# Patient Record
Sex: MALE | NOT HISPANIC OR LATINO | URBAN - METROPOLITAN AREA
[De-identification: names, ages, dates, MRNs, and addresses within clinical notes are randomized per-mention and may not be internally consistent; named-entity substitution may affect disease eponyms.]

---

## 2018-01-22 ENCOUNTER — IMPORTED ENCOUNTER (OUTPATIENT)
Dept: URBAN - METROPOLITAN AREA CLINIC 1 | Facility: CLINIC | Age: 28
End: 2018-01-22

## 2018-01-22 PROBLEM — H50.10: Noted: 2018-01-22

## 2018-01-22 PROCEDURE — 92004 COMPRE OPH EXAM NEW PT 1/>: CPT

## 2018-01-22 NOTE — PATIENT DISCUSSION
1.  Exotropia OU: (H/o CVA since birth)2. Return for an appointment for 1yr/30 with Dr. Carolann Berg.

## 2019-03-15 ENCOUNTER — IMPORTED ENCOUNTER (OUTPATIENT)
Dept: URBAN - METROPOLITAN AREA CLINIC 1 | Facility: CLINIC | Age: 29
End: 2019-03-15

## 2019-03-15 PROBLEM — H50.10: Noted: 2019-03-15

## 2019-03-15 PROCEDURE — 92014 COMPRE OPH EXAM EST PT 1/>: CPT

## 2019-05-13 NOTE — PROCEDURE NOTE: CLINICAL
PROCEDURE NOTE: Laser for Retinal Tear OD. Diagnosis: Horseshoe Tear of Retina Without Detachment. Prior to laser, risks/benefits/alternatives to laser discussed including loss of vision, decreased peripheral and night vision, need for more laser and/or surgery and patient wished to proceed. An informed consent was obtained and no assurances or guarantees were given. Spot size: 50 um. Pulse power: 300 mW. Pulse duration: 150 ms. Number of pulses: 97. Procedure Time: 4:38. Patient tolerated procedure well. There were no complications. Post-op instructions given. Patient given office phone number/answering service number and advised to call immediately should there be loss of vision or pain, or should they have any other questions or concerns. Ash Casiano

## 2019-05-21 NOTE — PATIENT DISCUSSION
? COMPONENT OF ALCOHOL TOBACCO SYNDROME - LABS 5 16 19 POSITIVE LEAD 6 (ML <5), LOW WBC 3.4;  NEGATIVE B12, FOLATE - RECOM EVAL WITH INTERNIST FOR LEAD.

## 2019-05-21 NOTE — PATIENT DISCUSSION
Recommended laser AROUND HOLE GIVEN NEED FOR CATARACT SURGERY SHORTLY.  Discussed alternatives/benefits/risks to include development of new tears, tears along the edge of treated area, and retinal detachment.

## 2019-05-21 NOTE — PATIENT DISCUSSION
RECOM WAITING 4 WKS FOR CATARACT SURGERY TO ALLOW LASER TO HEAL OD OTHERWISE NO CONTRAINDICATION TO CAT SURGERY.

## 2019-05-21 NOTE — PATIENT DISCUSSION
Cataract APPEARS VISUALLY SIGNIFICANT and patient advised to SEE  [facility] and treatment.

## 2020-07-09 ENCOUNTER — IMPORTED ENCOUNTER (OUTPATIENT)
Dept: URBAN - METROPOLITAN AREA CLINIC 1 | Facility: CLINIC | Age: 30
End: 2020-07-09

## 2020-07-09 PROBLEM — H50.10: Noted: 2020-07-09

## 2020-07-09 PROCEDURE — 92014 COMPRE OPH EXAM EST PT 1/>: CPT

## 2020-07-09 PROCEDURE — 92015 DETERMINE REFRACTIVE STATE: CPT

## 2020-07-09 NOTE — PATIENT DISCUSSION
1.  Exotropia OU: (H/o CVA since birth)2. MRX for glasses given. Return for an appointment in 1 year 27 with Dr. Grant Browne.

## 2021-12-08 ENCOUNTER — IMPORTED ENCOUNTER (OUTPATIENT)
Dept: URBAN - METROPOLITAN AREA CLINIC 1 | Facility: CLINIC | Age: 31
End: 2021-12-08

## 2021-12-08 PROBLEM — H52.12: Noted: 2021-12-08

## 2021-12-08 PROBLEM — H50.10: Noted: 2021-12-08

## 2021-12-08 PROBLEM — H52.31: Noted: 2021-12-08

## 2021-12-08 PROCEDURE — 92015 DETERMINE REFRACTIVE STATE: CPT

## 2021-12-08 PROCEDURE — 92014 COMPRE OPH EXAM EST PT 1/>: CPT

## 2021-12-08 NOTE — PATIENT DISCUSSION
1.  Exotropia OU2. Anisometropia OS3. Myopia: Rx was given for correction if indicated and requested. 4. Return for an appointment in 1 year for 30 with Dr. Shaheen Fountain.

## 2022-04-02 ASSESSMENT — VISUAL ACUITY
OS_CC: J1
OD_SC: 20/20
OD_CC: J1
OS_SC: 20/20
OS_SC: 20/20
OD_SC: 20/20
OD_SC: 20/20
OS_CC: J1
OD_CC: J1
OS_SC: 20/20
OD_SC: 20/20
OS_SC: 20/20

## 2022-04-02 ASSESSMENT — TONOMETRY
OD_IOP_MMHG: 15
OS_IOP_MMHG: 15
OD_IOP_MMHG: 18
OS_IOP_MMHG: 15
OS_IOP_MMHG: 18
OS_IOP_MMHG: 15
OD_IOP_MMHG: 16
OD_IOP_MMHG: 15